# Patient Record
Sex: MALE
[De-identification: names, ages, dates, MRNs, and addresses within clinical notes are randomized per-mention and may not be internally consistent; named-entity substitution may affect disease eponyms.]

---

## 2020-12-18 ENCOUNTER — RESULT REVIEW (OUTPATIENT)
Age: 60
End: 2020-12-18

## 2022-08-23 PROBLEM — Z00.00 ENCOUNTER FOR PREVENTIVE HEALTH EXAMINATION: Status: ACTIVE | Noted: 2022-08-23

## 2022-08-24 ENCOUNTER — APPOINTMENT (OUTPATIENT)
Dept: INTERNAL MEDICINE | Facility: CLINIC | Age: 62
End: 2022-08-24

## 2022-08-24 VITALS
HEART RATE: 62 BPM | TEMPERATURE: 97.6 F | DIASTOLIC BLOOD PRESSURE: 86 MMHG | WEIGHT: 209 LBS | BODY MASS INDEX: 30.96 KG/M2 | SYSTOLIC BLOOD PRESSURE: 122 MMHG | OXYGEN SATURATION: 99 % | HEIGHT: 69 IN

## 2022-08-24 DIAGNOSIS — Z80.42 FAMILY HISTORY OF MALIGNANT NEOPLASM OF PROSTATE: ICD-10-CM

## 2022-08-24 DIAGNOSIS — Z78.9 OTHER SPECIFIED HEALTH STATUS: ICD-10-CM

## 2022-08-24 DIAGNOSIS — Z82.49 FAMILY HISTORY OF ISCHEMIC HEART DISEASE AND OTHER DISEASES OF THE CIRCULATORY SYSTEM: ICD-10-CM

## 2022-08-24 DIAGNOSIS — Z76.89 PERSONS ENCOUNTERING HEALTH SERVICES IN OTHER SPECIFIED CIRCUMSTANCES: ICD-10-CM

## 2022-08-24 DIAGNOSIS — Z83.3 FAMILY HISTORY OF DIABETES MELLITUS: ICD-10-CM

## 2022-08-24 DIAGNOSIS — Z82.0 FAMILY HISTORY OF EPILEPSY AND OTHER DISEASES OF THE NERVOUS SYSTEM: ICD-10-CM

## 2022-08-24 DIAGNOSIS — Z86.39 PERSONAL HISTORY OF OTHER ENDOCRINE, NUTRITIONAL AND METABOLIC DISEASE: ICD-10-CM

## 2022-08-24 LAB
25(OH)D3 SERPL-MCNC: 22.2 NG/ML
ALBUMIN SERPL ELPH-MCNC: 4.8 G/DL
ALP BLD-CCNC: 76 U/L
ALT SERPL-CCNC: 29 U/L
ANION GAP SERPL CALC-SCNC: 13 MMOL/L
AST SERPL-CCNC: 24 U/L
BILIRUB SERPL-MCNC: 0.4 MG/DL
BUN SERPL-MCNC: 17 MG/DL
CALCIUM SERPL-MCNC: 9.8 MG/DL
CHLORIDE SERPL-SCNC: 104 MMOL/L
CHOLEST SERPL-MCNC: 212 MG/DL
CO2 SERPL-SCNC: 24 MMOL/L
CREAT SERPL-MCNC: 0.9 MG/DL
EGFR: 98 ML/MIN/1.73M2
GLUCOSE SERPL-MCNC: 87 MG/DL
HDLC SERPL-MCNC: 44 MG/DL
LDLC SERPL CALC-MCNC: 122 MG/DL
NONHDLC SERPL-MCNC: 168 MG/DL
POTASSIUM SERPL-SCNC: 4.4 MMOL/L
PROT SERPL-MCNC: 7.1 G/DL
SODIUM SERPL-SCNC: 141 MMOL/L
TRIGL SERPL-MCNC: 231 MG/DL
VIT B12 SERPL-MCNC: 627 PG/ML

## 2022-08-24 PROCEDURE — 99203 OFFICE O/P NEW LOW 30 MIN: CPT

## 2022-08-24 NOTE — HISTORY OF PRESENT ILLNESS
[de-identified] : 61 years old male presented for first time to establish care, refer h/o HTN, complaints of chronic left elbow pain, tenonitis , had infiltration done before, but pain persistent

## 2022-08-24 NOTE — PLAN
[FreeTextEntry1] : 1. establish care\par * labs for vitamin levels \par 2. hypertension\par * refill of amlodipine- benazepril and atenolol\par * low sodium diet stressed\par 3. hyperlipidemia\par * repeat fasting lab for lipids and A1c\par * low cholesterol, low fat diet recommended, counselled\par 4. left elbow pain\par * orthopedic surgery referral\par * follow up one month

## 2022-08-24 NOTE — HEALTH RISK ASSESSMENT
[Good] : ~his/her~  mood as  good [Never] : Never [Yes] : Yes [Monthly or less (1 pt)] : Monthly or less (1 point) [1 or 2 (0 pts)] : 1 or 2 (0 points) [No falls in past year] : Patient reported no falls in the past year [0] : 2) Feeling down, depressed, or hopeless: Not at all (0) [PHQ-2 Negative - No further assessment needed] : PHQ-2 Negative - No further assessment needed [Patient reported colonoscopy was normal] : Patient reported colonoscopy was normal [HIV test declined] : HIV test declined [Hepatitis C test declined] : Hepatitis C test declined [None] : None [With Family] : lives with family [Employed] : employed [College] : College [] :  [# Of Children ___] : has [unfilled] children [Sexually Active] : sexually active [Feels Safe at Home] : Feels safe at home [Fully functional (bathing, dressing, toileting, transferring, walking, feeding)] : Fully functional (bathing, dressing, toileting, transferring, walking, feeding) [Fully functional (using the telephone, shopping, preparing meals, housekeeping, doing laundry, using] : Fully functional and needs no help or supervision to perform IADLs (using the telephone, shopping, preparing meals, housekeeping, doing laundry, using transportation, managing medications and managing finances) [Reports changes in vision] : Reports changes in vision [Smoke Detector] : smoke detector [Carbon Monoxide Detector] : carbon monoxide detector [Seat Belt] :  uses seat belt [[id number]] : 0 [Change in mental status noted] : No change in mental status noted [Reports changes in hearing] : Reports no changes in hearing [Reports changes in dental health] : Reports no changes in dental health [Guns at Home] : no guns at home [ColonoscopyDate] : 2014 [FreeTextEntry2] :

## 2022-08-25 LAB
ESTIMATED AVERAGE GLUCOSE: 123 MG/DL
HBA1C MFR BLD HPLC: 5.9 %

## 2022-09-09 ENCOUNTER — APPOINTMENT (OUTPATIENT)
Dept: ORTHOPEDIC SURGERY | Facility: CLINIC | Age: 62
End: 2022-09-09

## 2022-09-09 VITALS — WEIGHT: 210 LBS | HEIGHT: 69 IN | BODY MASS INDEX: 31.1 KG/M2

## 2022-09-09 DIAGNOSIS — G56.22 LESION OF ULNAR NERVE, LEFT UPPER LIMB: ICD-10-CM

## 2022-09-09 DIAGNOSIS — M25.522 PAIN IN LEFT ELBOW: ICD-10-CM

## 2022-09-09 DIAGNOSIS — M77.02 MEDIAL EPICONDYLITIS, LEFT ELBOW: ICD-10-CM

## 2022-09-09 PROCEDURE — 73080 X-RAY EXAM OF ELBOW: CPT | Mod: LT

## 2022-09-09 PROCEDURE — 99203 OFFICE O/P NEW LOW 30 MIN: CPT

## 2022-09-09 NOTE — PHYSICAL EXAM
[de-identified] : General Exam\par \par Well developed, well nourished\par No apparent distress\par Oriented to person, place, and time\par Mood: Normal\par Affect: Normal\par Balance and coordination: Normal\par Gait: Normal\par \par left elbow exam:\par \par Skin: Clean, dry, intact. No ecchymosis. No swelling. No palpable joint effusion. No gross deformity.\par Tenderness: - tenderness to palpation lateral epicondyle, + medial epicondyle, no ttp olecranon, radial head. Pain with resisted wrist flex and pronation\par ROM:0-140° full pronation full supination.  There is subluxation of the ulnar nerve with resisted elbow flexion\par Stability: Stable to vaus/valgus stress\par Neuro: +tinels at ulnar canal, AIN/PIN/Ulnar nerve in tact to motor/sensation.\par Strength: 5/5 elbow flexion, 5/5 elbow extension, 5/5 supination, 5/5 pronation\par Sensation: In tact to light touch throughout\par Vasc: 2+ radial pulse, <2s cap refill\par  [de-identified] : \par The following radiographs were ordered and read by me during this patients visit. I reviewed each radiograph in detail with the patient and discussed the findings as highlighted below. \par \par 3 views left elbow] were obtained today that show no fracture, dislocation. There is no degenerative change seen. There is no malalignment. No obvious osseous abnormality. Otherwise unremarkable.

## 2022-09-09 NOTE — DISCUSSION/SUMMARY
[de-identified] : Left elbow medial epicondylitis with ulnar neuritis/cubital Tunnel syndrome.  His biggest complaint is night pain with radiation to the hand numbness and tingling.  He has had anti-inflammatory medications and a corticosteroid injection in the past done outside.  Will refer to hand and upper extremity for evaluation he may follow-up as needed.  All questions answered

## 2022-09-09 NOTE — HISTORY OF PRESENT ILLNESS
[de-identified] : 68yo male presents complaining of left medial elbow pain for 2 years.  He states initially he had medial and lateral elbow pain.  His PCP at that time injected lateral aspect which helped significantly.  He then had an injection medially which did not help.  He reports pain medial elbow that radiates down into hand and fingers.  He reports numbness and tingling in his pinky and ring finger.  He states this is moderate to severe at night.  He is here today for further evaluation.  \par \par The patient's past medical history, past surgical history, medications, allergies, and social history were reviewed by me today with the patient and documented accordingly. In addition, the patient's family history, which is noncontributory to this visit, was also reviewed.\par

## 2022-09-20 ENCOUNTER — APPOINTMENT (OUTPATIENT)
Dept: ORTHOPEDIC SURGERY | Facility: CLINIC | Age: 62
End: 2022-09-20

## 2022-09-20 VITALS — HEIGHT: 69 IN | BODY MASS INDEX: 31.1 KG/M2 | WEIGHT: 210 LBS

## 2022-09-20 DIAGNOSIS — I10 ESSENTIAL (PRIMARY) HYPERTENSION: ICD-10-CM

## 2022-09-20 PROCEDURE — 99213 OFFICE O/P EST LOW 20 MIN: CPT

## 2022-09-20 NOTE — REVIEW OF SYSTEMS
[Right] : right [Negative] : Allergic/Immunologic [FreeTextEntry9] : left ulnar neuritis, medial elbow pain

## 2022-09-20 NOTE — CONSULT LETTER
[Dear  ___] : Dear  [unfilled], [Consult Letter:] : I had the pleasure of evaluating your patient, [unfilled]. [Consult Closing:] : Thank you very much for allowing me to participate in the care of this patient.  If you have any questions, please do not hesitate to contact me. [FreeTextEntry1] : Mr. Riggs presented with signs and symptoms of cubital tunnel syndrome with ulnar nerve subluxation.  Have recommended EMG to rule out additional compression at the wrist.  Would then recommend proceeding with ulnar nerve decompression and transposition.  I will inspect the flexor-pronator mass at the time of surgery and debride/ repair if indicated. [FreeTextEntry3] : Dale Peña

## 2022-09-20 NOTE — HISTORY OF PRESENT ILLNESS
[FreeTextEntry1] : 61 year-old right-hand dominant male with 1.5 year history of left elbow pain and ulnar neuritis.  Reports that 1 year ago he had a corticosteroid injection to his lateral elbow which alleviated his symptoms of lateral epicondylitis.  He had an injection to the medial elbow March 2022 without relief.  He reports his worst pain is a burning dysesthesia that radiates from his elbow to the small and ring finger.  This happens with elbow flexion during the day as well as constantly at night.  He is not wearing night-time elbow extension braces.  He now feels as though he is beginning to lose  strength in the hand.  The pain and lack of strength makes it difficult to perform his job as a .  He also had to stop lifting at the gym because of the weakness and pain.  He awakens nightly.  Symptoms have not affected his ADLs.  He presents today as a referral from Dr. Lauren for further workup and treatment plan.

## 2022-09-20 NOTE — ASSESSMENT
[FreeTextEntry1] : 61 year-old R-HD male with symptoms of L cubital tunnel which affect work sleep and activities of leisure.  He now notes motor weakness over the past couple of months.  Recommended EMG to confirm diagnosis.  Discussed that in his case would recommend ulnar nerve decompression for symptoms of neuritis and motor weakness and also transposition given his subluxation with ROM about the elbow.  At that time, would inspect the origin of his flexor-pronator mass to determine if medial epicondyle debridement and tendon repair was indicated. If indicated, would perform as well otherwise would recommend nonoperative therapy for the medial epicondyle.  All of his questions were answered.\par \par Plan:\par EMG LUE to assess for ulnar neuritis at elbow and wrist and to r/o cervical radiculopathy\par F/u after EMG for surgical planning of ulnar nerve decompression with transposition.

## 2022-09-20 NOTE — PHYSICAL EXAM
[de-identified] : General: NAD\par RSP: Nonlabored\par MSK:\par LUE:\par No open lacerations, abrasions, swelling, ecchymosis\par Elbow flex-ex 0-135, pronosupination 85-90\par +Tinel's cubital tunnel, +hyperflexion of elbow, ulnar nerve subluxates with elbow flexion\par -Froments\par -Tinels, -Durkans, -Phalens at carpal tunnel\par SILT throughout all digits\par 4/5 IO strength\par TTP at L medial epicondyle, not worsened with resisted flexion-pronation\par \par RUE:\par SILT throughout\par -Tinels, hyperflexion at elbow, ulnar nerve perches but does not fully dislocate with elbow flexion\par -Tinels, Durkans, Phalens at carpal tunnel\par -5 to 135 elbow flex-ex, 90/90 pronation supination\par 5/5 strength throughout [de-identified] : XR L Elbow: No fracture nor dislocation, no bony anomaly

## 2022-10-12 ENCOUNTER — APPOINTMENT (OUTPATIENT)
Dept: INTERNAL MEDICINE | Facility: CLINIC | Age: 62
End: 2022-10-12

## 2023-04-17 ENCOUNTER — APPOINTMENT (OUTPATIENT)
Dept: INTERNAL MEDICINE | Facility: CLINIC | Age: 63
End: 2023-04-17
Payer: COMMERCIAL

## 2023-04-17 ENCOUNTER — TRANSCRIPTION ENCOUNTER (OUTPATIENT)
Age: 63
End: 2023-04-17

## 2023-04-17 VITALS
HEART RATE: 75 BPM | OXYGEN SATURATION: 97 % | SYSTOLIC BLOOD PRESSURE: 126 MMHG | TEMPERATURE: 98 F | BODY MASS INDEX: 31.84 KG/M2 | WEIGHT: 215 LBS | HEIGHT: 69 IN | DIASTOLIC BLOOD PRESSURE: 86 MMHG

## 2023-04-17 DIAGNOSIS — M54.50 LOW BACK PAIN, UNSPECIFIED: ICD-10-CM

## 2023-04-17 DIAGNOSIS — G89.29 LOW BACK PAIN, UNSPECIFIED: ICD-10-CM

## 2023-04-17 DIAGNOSIS — E55.9 VITAMIN D DEFICIENCY, UNSPECIFIED: ICD-10-CM

## 2023-04-17 PROCEDURE — 99214 OFFICE O/P EST MOD 30 MIN: CPT

## 2023-04-17 RX ORDER — ERGOCALCIFEROL 1.25 MG/1
1.25 MG CAPSULE ORAL
Qty: 12 | Refills: 1 | Status: ACTIVE | COMMUNITY
Start: 2023-04-17 | End: 1900-01-01

## 2023-04-17 RX ORDER — POLYETHYLENE GLYCOL-3350 AND ELECTROLYTES WITH FLAVOR PACK 240; 5.84; 2.98; 6.72; 22.72 G/278.26G; G/278.26G; G/278.26G; G/278.26G; G/278.26G
240 POWDER, FOR SOLUTION ORAL
Qty: 4000 | Refills: 0 | Status: DISCONTINUED | COMMUNITY
Start: 2023-01-23

## 2023-04-17 RX ORDER — ESOMEPRAZOLE MAGNESIUM 40 MG/1
40 CAPSULE, DELAYED RELEASE ORAL
Qty: 30 | Refills: 0 | Status: ACTIVE | COMMUNITY
Start: 2023-02-21

## 2023-04-17 RX ORDER — OSELTAMIVIR PHOSPHATE 75 MG/1
75 CAPSULE ORAL
Qty: 10 | Refills: 0 | Status: DISCONTINUED | COMMUNITY
Start: 2022-11-14

## 2023-04-17 RX ORDER — FLUTICASONE PROPIONATE 50 UG/1
50 SPRAY, METERED NASAL
Qty: 10 | Refills: 0 | Status: DISCONTINUED | COMMUNITY
Start: 2022-11-14

## 2023-04-17 NOTE — HISTORY OF PRESENT ILLNESS
[FreeTextEntry1] : follow up\par Interview and discussion conducted in Panamanian by Panamanian speaking physician\par  [de-identified] : 62 years old male presents for follow up; last seen in August last year, h/o HTN, hyperlipidemia; didn't follow up last year b/o he was in [place]; complaints today of fatigue, low back pain, referred to inguinal area, he consulted urologist to check prostate, he was concerned, denies urinary obstructive symptoms; refers also pain on legs, he work as , heavy labor

## 2023-04-17 NOTE — PLAN
[FreeTextEntry1] : * c/w amlodipine and atenolol\par * referral for fasting labs CMP, lipids, A1c\par * low cholesterol, low triglycerides diet,dietary counseling given; dietary avoidance discussed; diet and exercise reviewed with patient\par * Dietary counseling given, dietary avoidance discussed, diet and exercise reviewed with patient; patient reminded of importance of aerobic exercise, weight control, dietary compliance and regular glucose monitoring\par * referral for lumbo sacral spine x rays\par * referral for physical therapy\par * start weekly vitamin d supplement\par * f/u one month.

## 2023-04-22 ENCOUNTER — OUTPATIENT (OUTPATIENT)
Dept: OUTPATIENT SERVICES | Facility: HOSPITAL | Age: 63
LOS: 1 days | End: 2023-04-22
Payer: COMMERCIAL

## 2023-04-22 ENCOUNTER — APPOINTMENT (OUTPATIENT)
Dept: RADIOLOGY | Facility: CLINIC | Age: 63
End: 2023-04-22
Payer: COMMERCIAL

## 2023-04-22 DIAGNOSIS — M54.50 LOW BACK PAIN, UNSPECIFIED: ICD-10-CM

## 2023-04-22 PROCEDURE — 72114 X-RAY EXAM L-S SPINE BENDING: CPT | Mod: 26

## 2023-04-22 PROCEDURE — 72114 X-RAY EXAM L-S SPINE BENDING: CPT

## 2023-04-25 LAB
ALBUMIN SERPL ELPH-MCNC: 4.8 G/DL
ALP BLD-CCNC: 70 U/L
ALT SERPL-CCNC: 39 U/L
ANION GAP SERPL CALC-SCNC: 11 MMOL/L
AST SERPL-CCNC: 24 U/L
BILIRUB SERPL-MCNC: 0.4 MG/DL
BUN SERPL-MCNC: 19 MG/DL
CALCIUM SERPL-MCNC: 9.9 MG/DL
CHLORIDE SERPL-SCNC: 105 MMOL/L
CHOLEST SERPL-MCNC: 219 MG/DL
CO2 SERPL-SCNC: 24 MMOL/L
CREAT SERPL-MCNC: 0.94 MG/DL
EGFR: 92 ML/MIN/1.73M2
ESTIMATED AVERAGE GLUCOSE: 114 MG/DL
GLUCOSE SERPL-MCNC: 102 MG/DL
HBA1C MFR BLD HPLC: 5.6 %
HDLC SERPL-MCNC: 48 MG/DL
LDLC SERPL CALC-MCNC: 136 MG/DL
NONHDLC SERPL-MCNC: 171 MG/DL
POTASSIUM SERPL-SCNC: 4.4 MMOL/L
PROT SERPL-MCNC: 7.1 G/DL
SODIUM SERPL-SCNC: 140 MMOL/L
TRIGL SERPL-MCNC: 174 MG/DL

## 2023-06-26 ENCOUNTER — APPOINTMENT (OUTPATIENT)
Dept: INTERNAL MEDICINE | Facility: CLINIC | Age: 63
End: 2023-06-26

## 2023-08-09 ENCOUNTER — RX RENEWAL (OUTPATIENT)
Age: 63
End: 2023-08-09

## 2023-08-09 RX ORDER — ATENOLOL 25 MG/1
25 TABLET ORAL DAILY
Qty: 90 | Refills: 2 | Status: ACTIVE | COMMUNITY
Start: 2023-08-09 | End: 1900-01-01

## 2023-08-29 ENCOUNTER — NON-APPOINTMENT (OUTPATIENT)
Age: 63
End: 2023-08-29

## 2023-08-29 ENCOUNTER — APPOINTMENT (OUTPATIENT)
Dept: INTERNAL MEDICINE | Facility: CLINIC | Age: 63
End: 2023-08-29
Payer: COMMERCIAL

## 2023-08-29 VITALS
BODY MASS INDEX: 31.55 KG/M2 | TEMPERATURE: 97.9 F | HEIGHT: 69 IN | OXYGEN SATURATION: 97 % | SYSTOLIC BLOOD PRESSURE: 113 MMHG | DIASTOLIC BLOOD PRESSURE: 79 MMHG | WEIGHT: 213 LBS | HEART RATE: 67 BPM

## 2023-08-29 DIAGNOSIS — Z00.00 ENCOUNTER FOR GENERAL ADULT MEDICAL EXAMINATION W/OUT ABNORMAL FINDINGS: ICD-10-CM

## 2023-08-29 DIAGNOSIS — K29.50 UNSPECIFIED CHRONIC GASTRITIS W/OUT BLEEDING: ICD-10-CM

## 2023-08-29 DIAGNOSIS — K64.4 RESIDUAL HEMORRHOIDAL SKIN TAGS: ICD-10-CM

## 2023-08-29 LAB
25(OH)D3 SERPL-MCNC: 32.9 NG/ML
ALBUMIN SERPL ELPH-MCNC: 4.6 G/DL
ALP BLD-CCNC: 79 U/L
ALT SERPL-CCNC: 27 U/L
ANION GAP SERPL CALC-SCNC: 15 MMOL/L
AST SERPL-CCNC: 21 U/L
BILIRUB SERPL-MCNC: 0.4 MG/DL
BUN SERPL-MCNC: 19 MG/DL
CALCIUM SERPL-MCNC: 9.8 MG/DL
CHLORIDE SERPL-SCNC: 104 MMOL/L
CHOLEST SERPL-MCNC: 196 MG/DL
CO2 SERPL-SCNC: 23 MMOL/L
CREAT SERPL-MCNC: 0.9 MG/DL
EGFR: 97 ML/MIN/1.73M2
ESTIMATED AVERAGE GLUCOSE: 123 MG/DL
GLUCOSE SERPL-MCNC: 90 MG/DL
HBA1C MFR BLD HPLC: 5.9 %
HDLC SERPL-MCNC: 41 MG/DL
LDLC SERPL CALC-MCNC: 121 MG/DL
NONHDLC SERPL-MCNC: 156 MG/DL
POTASSIUM SERPL-SCNC: 4.7 MMOL/L
PROT SERPL-MCNC: 7 G/DL
PSA FREE FLD-MCNC: 23 %
PSA FREE SERPL-MCNC: 0.34 NG/ML
PSA SERPL-MCNC: 1.51 NG/ML
SODIUM SERPL-SCNC: 141 MMOL/L
T4 FREE SERPL-MCNC: 1.2 NG/DL
TRIGL SERPL-MCNC: 198 MG/DL
TSH SERPL-ACNC: 1.57 UIU/ML
VIT B12 SERPL-MCNC: 606 PG/ML

## 2023-08-29 PROCEDURE — 99396 PREV VISIT EST AGE 40-64: CPT

## 2023-08-29 RX ORDER — HYDROCORTISONE 2.5% 25 MG/G
2.5 CREAM TOPICAL
Qty: 1 | Refills: 1 | Status: ACTIVE | COMMUNITY
Start: 2023-08-29 | End: 1900-01-01

## 2023-08-29 NOTE — HEALTH RISK ASSESSMENT
[Good] : ~his/her~  mood as  good [No] : No [No falls in past year] : Patient reported no falls in the past year [0] : 2) Feeling down, depressed, or hopeless: Not at all (0) [PHQ-2 Negative - No further assessment needed] : PHQ-2 Negative - No further assessment needed [Patient reported colonoscopy was normal] : Patient reported colonoscopy was normal [HIV test declined] : HIV test declined [Hepatitis C test declined] : Hepatitis C test declined [None] : None [With Family] : lives with family [Employed] : employed [College] : College [] :  [# Of Children ___] : has [unfilled] children [Sexually Active] : sexually active [Feels Safe at Home] : Feels safe at home [Fully functional (bathing, dressing, toileting, transferring, walking, feeding)] : Fully functional (bathing, dressing, toileting, transferring, walking, feeding) [Fully functional (using the telephone, shopping, preparing meals, housekeeping, doing laundry, using] : Fully functional and needs no help or supervision to perform IADLs (using the telephone, shopping, preparing meals, housekeeping, doing laundry, using transportation, managing medications and managing finances) [Smoke Detector] : smoke detector [Carbon Monoxide Detector] : carbon monoxide detector [Seat Belt] :  uses seat belt [Never] : Never [[id number]] : 0 [Change in mental status noted] : No change in mental status noted [Reports changes in hearing] : Reports no changes in hearing [Reports changes in vision] : Reports no changes in vision [Reports changes in dental health] : Reports no changes in dental health [Guns at Home] : no guns at home [ColonoscopyDate] : 2014 [FreeTextEntry2] :

## 2023-08-29 NOTE — PLAN
[FreeTextEntry1] : * routine general labs done * age appropriate health maintenance recommendations reviewed, discussed including healthy diet, regular exercise * BP at goal, trying to d/c atenolol; c/w amlodipine - olmesartan * sodium restriction diet * low cholesterol, low triglycerides diet,dietary counseling given; dietary avoidance discussed; diet and exercise reviewed with patient * Dietary counseling given, dietary avoidance discussed, diet and exercise reviewed with patient; patient reminded of importance of aerobic exercise, weight control, dietary compliance and regular glucose monitoring * c/w esomeprazole daily * start proctozone- HC cream * recommended high fiber diet * FIT test * GI referral for EGD and colonoscopy * f/u one month.

## 2023-08-29 NOTE — HISTORY OF PRESENT ILLNESS
[de-identified] : 62 years old male presents for annual wellness visit; c/o recurrent chronic epigastric pain, moderate with reflux, also external hemorrhoid and discomfort; no rectal pain nor bleeding

## 2023-08-30 LAB
APPEARANCE: CLEAR
BACTERIA: NEGATIVE /HPF
BILIRUBIN URINE: NEGATIVE
BLOOD URINE: NEGATIVE
CAST: 2 /LPF
COLOR: YELLOW
EPITHELIAL CELLS: 0 /HPF
GLUCOSE QUALITATIVE U: NEGATIVE MG/DL
KETONES URINE: NEGATIVE MG/DL
LEUKOCYTE ESTERASE URINE: NEGATIVE
MICROSCOPIC-UA: NORMAL
NITRITE URINE: NEGATIVE
PH URINE: 5.5
PROTEIN URINE: NEGATIVE MG/DL
RED BLOOD CELLS URINE: 0 /HPF
SPECIFIC GRAVITY URINE: 1.02
UROBILINOGEN URINE: 0.2 MG/DL
WHITE BLOOD CELLS URINE: 2 /HPF

## 2023-10-12 ENCOUNTER — APPOINTMENT (OUTPATIENT)
Dept: GASTROENTEROLOGY | Facility: CLINIC | Age: 63
End: 2023-10-12
Payer: COMMERCIAL

## 2023-10-12 VITALS
HEIGHT: 69 IN | SYSTOLIC BLOOD PRESSURE: 100 MMHG | WEIGHT: 213 LBS | HEART RATE: 66 BPM | BODY MASS INDEX: 31.55 KG/M2 | TEMPERATURE: 97.1 F | OXYGEN SATURATION: 99 % | DIASTOLIC BLOOD PRESSURE: 70 MMHG

## 2023-10-12 DIAGNOSIS — R10.13 EPIGASTRIC PAIN: ICD-10-CM

## 2023-10-12 DIAGNOSIS — K21.9 GASTRO-ESOPHAGEAL REFLUX DISEASE W/OUT ESOPHAGITIS: ICD-10-CM

## 2023-10-12 DIAGNOSIS — Z12.11 ENCOUNTER FOR SCREENING FOR MALIGNANT NEOPLASM OF COLON: ICD-10-CM

## 2023-10-12 DIAGNOSIS — A04.8 OTHER SPECIFIED BACTERIAL INTESTINAL INFECTIONS: ICD-10-CM

## 2023-10-12 PROCEDURE — 99204 OFFICE O/P NEW MOD 45 MIN: CPT

## 2023-10-12 RX ORDER — SODIUM SULFATE, POTASSIUM SULFATE AND MAGNESIUM SULFATE 1.6; 3.13; 17.5 G/177ML; G/177ML; G/177ML
17.5-3.13-1.6 SOLUTION ORAL
Qty: 2 | Refills: 0 | Status: ACTIVE | COMMUNITY
Start: 2023-10-12 | End: 1900-01-01

## 2023-10-21 ENCOUNTER — LABORATORY RESULT (OUTPATIENT)
Age: 63
End: 2023-10-21

## 2023-11-16 ENCOUNTER — APPOINTMENT (OUTPATIENT)
Dept: INTERNAL MEDICINE | Facility: CLINIC | Age: 63
End: 2023-11-16
Payer: COMMERCIAL

## 2023-11-16 VITALS
HEIGHT: 69 IN | WEIGHT: 210 LBS | DIASTOLIC BLOOD PRESSURE: 85 MMHG | OXYGEN SATURATION: 99 % | SYSTOLIC BLOOD PRESSURE: 125 MMHG | HEART RATE: 93 BPM | TEMPERATURE: 97.7 F | BODY MASS INDEX: 31.1 KG/M2

## 2023-11-16 DIAGNOSIS — Z23 ENCOUNTER FOR IMMUNIZATION: ICD-10-CM

## 2023-11-16 DIAGNOSIS — R73.03 PREDIABETES.: ICD-10-CM

## 2023-11-16 DIAGNOSIS — R53.83 OTHER FATIGUE: ICD-10-CM

## 2023-11-16 DIAGNOSIS — E78.2 MIXED HYPERLIPIDEMIA: ICD-10-CM

## 2023-11-16 DIAGNOSIS — I10 ESSENTIAL (PRIMARY) HYPERTENSION: ICD-10-CM

## 2023-11-16 PROCEDURE — 99214 OFFICE O/P EST MOD 30 MIN: CPT | Mod: 25

## 2023-11-16 PROCEDURE — 90686 IIV4 VACC NO PRSV 0.5 ML IM: CPT

## 2023-11-16 PROCEDURE — G0008: CPT

## 2023-11-17 ENCOUNTER — RX RENEWAL (OUTPATIENT)
Age: 63
End: 2023-11-17

## 2023-12-15 ENCOUNTER — APPOINTMENT (OUTPATIENT)
Dept: GASTROENTEROLOGY | Facility: AMBULATORY MEDICAL SERVICES | Age: 63
End: 2023-12-15
Payer: SELF-PAY

## 2023-12-15 PROCEDURE — 43239 EGD BIOPSY SINGLE/MULTIPLE: CPT | Mod: 59

## 2023-12-15 PROCEDURE — 45385 COLONOSCOPY W/LESION REMOVAL: CPT | Mod: 33,59

## 2024-02-14 ENCOUNTER — RX RENEWAL (OUTPATIENT)
Age: 64
End: 2024-02-14

## 2024-02-14 RX ORDER — AMLODIPINE BESYLATE AND BENAZEPRIL HYDROCHLORIDE 5; 20 MG/1; MG/1
5-20 CAPSULE ORAL
Qty: 90 | Refills: 2 | Status: ACTIVE | COMMUNITY
Start: 2023-08-09 | End: 1900-01-01

## 2024-02-14 RX ORDER — RABEPRAZOLE SODIUM 20 MG/1
20 TABLET, DELAYED RELEASE ORAL DAILY
Qty: 90 | Refills: 3 | Status: ACTIVE | COMMUNITY
Start: 1900-01-01 | End: 1900-01-01

## 2024-03-12 ENCOUNTER — RX RENEWAL (OUTPATIENT)
Age: 64
End: 2024-03-12

## 2024-03-12 RX ORDER — FAMOTIDINE 40 MG/1
40 TABLET, FILM COATED ORAL
Qty: 30 | Refills: 4 | Status: ACTIVE | COMMUNITY
Start: 2023-10-12 | End: 1900-01-01

## 2024-05-13 ENCOUNTER — RX RENEWAL (OUTPATIENT)
Age: 64
End: 2024-05-13

## 2024-05-13 RX ORDER — ERGOCALCIFEROL 1.25 MG/1
1.25 MG CAPSULE, LIQUID FILLED ORAL
Qty: 12 | Refills: 1 | Status: ACTIVE | COMMUNITY
Start: 2023-11-17 | End: 1900-01-01

## 2024-11-11 ENCOUNTER — RX RENEWAL (OUTPATIENT)
Age: 64
End: 2024-11-11

## 2024-11-14 ENCOUNTER — APPOINTMENT (OUTPATIENT)
Dept: INTERNAL MEDICINE | Facility: CLINIC | Age: 64
End: 2024-11-14
Payer: COMMERCIAL

## 2024-11-14 VITALS
RESPIRATION RATE: 16 BRPM | OXYGEN SATURATION: 97 % | DIASTOLIC BLOOD PRESSURE: 81 MMHG | HEIGHT: 69 IN | TEMPERATURE: 97.6 F | WEIGHT: 214 LBS | HEART RATE: 84 BPM | SYSTOLIC BLOOD PRESSURE: 126 MMHG | BODY MASS INDEX: 31.7 KG/M2

## 2024-11-14 DIAGNOSIS — I10 ESSENTIAL (PRIMARY) HYPERTENSION: ICD-10-CM

## 2024-11-14 DIAGNOSIS — H00.025 HORDEOLUM INTERNUM LEFT LOWER EYELID: ICD-10-CM

## 2024-11-14 DIAGNOSIS — H10.402 UNSPECIFIED CHRONIC CONJUNCTIVITIS, LEFT EYE: ICD-10-CM

## 2024-11-14 PROCEDURE — 99214 OFFICE O/P EST MOD 30 MIN: CPT

## 2024-11-14 PROCEDURE — G2211 COMPLEX E/M VISIT ADD ON: CPT

## 2024-11-14 RX ORDER — TOBRAMYCIN 3 MG/G
0.3 OINTMENT OPHTHALMIC
Qty: 1 | Refills: 0 | Status: ACTIVE | COMMUNITY
Start: 2024-11-14 | End: 1900-01-01

## 2024-11-22 ENCOUNTER — RX RENEWAL (OUTPATIENT)
Age: 64
End: 2024-11-22

## 2024-11-22 RX ORDER — CIPROFLOXACIN 3 MG/ML
0.3 SOLUTION OPHTHALMIC EVERY 4 HOURS
Qty: 2.5 | Refills: 0 | Status: ACTIVE | COMMUNITY
Start: 2024-11-14 | End: 1900-01-01

## 2025-03-12 ENCOUNTER — APPOINTMENT (OUTPATIENT)
Dept: INTERNAL MEDICINE | Facility: CLINIC | Age: 65
End: 2025-03-12

## 2025-07-05 ENCOUNTER — RX RENEWAL (OUTPATIENT)
Age: 65
End: 2025-07-05